# Patient Record
Sex: FEMALE | Race: BLACK OR AFRICAN AMERICAN
[De-identification: names, ages, dates, MRNs, and addresses within clinical notes are randomized per-mention and may not be internally consistent; named-entity substitution may affect disease eponyms.]

---

## 2019-08-21 ENCOUNTER — HOSPITAL ENCOUNTER (OUTPATIENT)
Dept: HOSPITAL 63 - RAD | Age: 44
Discharge: HOME | End: 2019-08-21
Attending: FAMILY MEDICINE
Payer: COMMERCIAL

## 2019-08-21 DIAGNOSIS — M79.641: Primary | ICD-10-CM

## 2019-08-21 DIAGNOSIS — M25.531: ICD-10-CM

## 2019-08-21 DIAGNOSIS — M25.521: ICD-10-CM

## 2019-08-21 PROCEDURE — 73130 X-RAY EXAM OF HAND: CPT

## 2019-08-21 PROCEDURE — 73110 X-RAY EXAM OF WRIST: CPT

## 2019-08-21 PROCEDURE — 73080 X-RAY EXAM OF ELBOW: CPT

## 2019-08-21 NOTE — RAD
Exam: Right elbow 3 views. Right hand 3 views. Right wrist 3 views.

 

INDICATION: Pain for several years

 

TECHNIQUE: Frontal, lateral and oblique views of the right elbow, wrist 

and hand

 

Comparisons: None

 

FINDINGS:

 

Right elbow:

Bone mineralization and development is normal. No acute or healed 

fractures. Soft tissues are unremarkable. Joint spaces are 

well-maintained.

 

Right hand:

Bone mineralization and development is normal. No acute or healed 

fractures. Soft tissues are unremarkable. Joint spaces are 

well-maintained.

 

Right wrist:

Bone mineralization is normal. No acute or healed fractures. Soft tissues 

are unremarkable. Joint spaces are well-maintained.

 

IMPRESSION:

1.  No osseous abnormality of the right elbow.

2.  No osseous abnormality of the right hand.

3.  No osseous evidence reality of the right wrist.

 

Electronically signed by: Ladi Miranda MD (8/21/2019 7:27 PM) [facility]

## 2019-08-21 NOTE — RAD
Exam: Right elbow 3 views. Right hand 3 views. Right wrist 3 views.

 

INDICATION: Pain for several years

 

TECHNIQUE: Frontal, lateral and oblique views of the right elbow, wrist 

and hand

 

Comparisons: None

 

FINDINGS:

 

Right elbow:

Bone mineralization and development is normal. No acute or healed 

fractures. Soft tissues are unremarkable. Joint spaces are 

well-maintained.

 

Right hand:

Bone mineralization and development is normal. No acute or healed 

fractures. Soft tissues are unremarkable. Joint spaces are 

well-maintained.

 

Right wrist:

Bone mineralization is normal. No acute or healed fractures. Soft tissues 

are unremarkable. Joint spaces are well-maintained.

 

IMPRESSION:

1.  No osseous abnormality of the right elbow.

2.  No osseous abnormality of the right hand.

3.  No osseous evidence reality of the right wrist.

 

Electronically signed by: Ladi Miranda MD (8/21/2019 7:27 PM) [facility]

## 2019-08-21 NOTE — RAD
Exam: Right elbow 3 views. Right hand 3 views. Right wrist 3 views.

 

INDICATION: Pain for several years

 

TECHNIQUE: Frontal, lateral and oblique views of the right elbow, wrist 

and hand

 

Comparisons: None

 

FINDINGS:

 

Right elbow:

Bone mineralization and development is normal. No acute or healed 

fractures. Soft tissues are unremarkable. Joint spaces are 

well-maintained.

 

Right hand:

Bone mineralization and development is normal. No acute or healed 

fractures. Soft tissues are unremarkable. Joint spaces are 

well-maintained.

 

Right wrist:

Bone mineralization is normal. No acute or healed fractures. Soft tissues 

are unremarkable. Joint spaces are well-maintained.

 

IMPRESSION:

1.  No osseous abnormality of the right elbow.

2.  No osseous abnormality of the right hand.

3.  No osseous evidence reality of the right wrist.

 

Electronically signed by: Ladi Miranda MD (8/21/2019 7:27 PM) [facility]